# Patient Record
Sex: MALE | Race: WHITE | NOT HISPANIC OR LATINO | Employment: FULL TIME | URBAN - METROPOLITAN AREA
[De-identification: names, ages, dates, MRNs, and addresses within clinical notes are randomized per-mention and may not be internally consistent; named-entity substitution may affect disease eponyms.]

---

## 2017-01-17 ENCOUNTER — COMMUNICATION - HEALTHEAST (OUTPATIENT)
Dept: FAMILY MEDICINE | Facility: CLINIC | Age: 57
End: 2017-01-17

## 2017-01-18 ENCOUNTER — OFFICE VISIT - HEALTHEAST (OUTPATIENT)
Dept: FAMILY MEDICINE | Facility: CLINIC | Age: 57
End: 2017-01-18

## 2017-01-18 ENCOUNTER — COMMUNICATION - HEALTHEAST (OUTPATIENT)
Dept: TELEHEALTH | Facility: CLINIC | Age: 57
End: 2017-01-18

## 2017-01-18 DIAGNOSIS — J32.9 SINUSITIS: ICD-10-CM

## 2017-01-18 DIAGNOSIS — Z00.00 PHYSICAL EXAM: ICD-10-CM

## 2017-01-18 DIAGNOSIS — C44.91 SKIN CANCER, BASAL CELL: ICD-10-CM

## 2017-01-18 LAB
CHOLEST SERPL-MCNC: 182 MG/DL
FASTING STATUS PATIENT QL REPORTED: YES
HDLC SERPL-MCNC: 45 MG/DL
LDLC SERPL CALC-MCNC: 103 MG/DL
TRIGL SERPL-MCNC: 172 MG/DL

## 2017-01-18 ASSESSMENT — MIFFLIN-ST. JEOR: SCORE: 1653.35

## 2017-01-20 ENCOUNTER — COMMUNICATION - HEALTHEAST (OUTPATIENT)
Dept: FAMILY MEDICINE | Facility: CLINIC | Age: 57
End: 2017-01-20

## 2017-01-30 ENCOUNTER — COMMUNICATION - HEALTHEAST (OUTPATIENT)
Dept: FAMILY MEDICINE | Facility: CLINIC | Age: 57
End: 2017-01-30

## 2017-03-02 ENCOUNTER — RECORDS - HEALTHEAST (OUTPATIENT)
Dept: ADMINISTRATIVE | Facility: OTHER | Age: 57
End: 2017-03-02

## 2017-03-06 ENCOUNTER — COMMUNICATION - HEALTHEAST (OUTPATIENT)
Dept: FAMILY MEDICINE | Facility: CLINIC | Age: 57
End: 2017-03-06

## 2017-03-07 ENCOUNTER — OFFICE VISIT - HEALTHEAST (OUTPATIENT)
Dept: FAMILY MEDICINE | Facility: CLINIC | Age: 57
End: 2017-03-07

## 2017-03-07 DIAGNOSIS — R09.81 SINUS CONGESTION: ICD-10-CM

## 2017-06-06 ENCOUNTER — RECORDS - HEALTHEAST (OUTPATIENT)
Dept: ADMINISTRATIVE | Facility: OTHER | Age: 57
End: 2017-06-06

## 2017-06-22 ENCOUNTER — RECORDS - HEALTHEAST (OUTPATIENT)
Dept: ADMINISTRATIVE | Facility: OTHER | Age: 57
End: 2017-06-22

## 2018-04-18 ENCOUNTER — COMMUNICATION - HEALTHEAST (OUTPATIENT)
Dept: FAMILY MEDICINE | Facility: CLINIC | Age: 58
End: 2018-04-18

## 2018-04-18 ENCOUNTER — OFFICE VISIT - HEALTHEAST (OUTPATIENT)
Dept: FAMILY MEDICINE | Facility: CLINIC | Age: 58
End: 2018-04-18

## 2018-04-18 DIAGNOSIS — Z00.00 PHYSICAL EXAM: ICD-10-CM

## 2018-04-18 DIAGNOSIS — Z13.220 SCREENING FOR LIPID DISORDERS: ICD-10-CM

## 2018-04-18 DIAGNOSIS — Z12.5 SCREENING FOR PROSTATE CANCER: ICD-10-CM

## 2018-04-18 LAB
ALBUMIN SERPL-MCNC: 3.8 G/DL (ref 3.5–5)
ALP SERPL-CCNC: 51 U/L (ref 45–120)
ALT SERPL W P-5'-P-CCNC: 22 U/L (ref 0–45)
ANION GAP SERPL CALCULATED.3IONS-SCNC: 8 MMOL/L (ref 5–18)
AST SERPL W P-5'-P-CCNC: 24 U/L (ref 0–40)
BILIRUB SERPL-MCNC: 0.6 MG/DL (ref 0–1)
BUN SERPL-MCNC: 13 MG/DL (ref 8–22)
CALCIUM SERPL-MCNC: 8.8 MG/DL (ref 8.5–10.5)
CHLORIDE BLD-SCNC: 108 MMOL/L (ref 98–107)
CHOLEST SERPL-MCNC: 177 MG/DL
CO2 SERPL-SCNC: 26 MMOL/L (ref 22–31)
CREAT SERPL-MCNC: 0.99 MG/DL (ref 0.7–1.3)
FASTING STATUS PATIENT QL REPORTED: NORMAL
GFR SERPL CREATININE-BSD FRML MDRD: >60 ML/MIN/1.73M2
GLUCOSE BLD-MCNC: 98 MG/DL (ref 70–125)
HDLC SERPL-MCNC: 49 MG/DL
LDLC SERPL CALC-MCNC: 111 MG/DL
POTASSIUM BLD-SCNC: 4.4 MMOL/L (ref 3.5–5)
PROT SERPL-MCNC: 6.5 G/DL (ref 6–8)
PSA SERPL-MCNC: 1 NG/ML (ref 0–3.5)
SODIUM SERPL-SCNC: 142 MMOL/L (ref 136–145)
TRIGL SERPL-MCNC: 87 MG/DL

## 2018-04-18 ASSESSMENT — MIFFLIN-ST. JEOR: SCORE: 1610.25

## 2018-08-20 ENCOUNTER — RECORDS - HEALTHEAST (OUTPATIENT)
Dept: ADMINISTRATIVE | Facility: OTHER | Age: 58
End: 2018-08-20

## 2018-09-05 ENCOUNTER — RECORDS - HEALTHEAST (OUTPATIENT)
Dept: ADMINISTRATIVE | Facility: OTHER | Age: 58
End: 2018-09-05

## 2019-01-23 ENCOUNTER — RECORDS - HEALTHEAST (OUTPATIENT)
Dept: ADMINISTRATIVE | Facility: OTHER | Age: 59
End: 2019-01-23

## 2019-02-21 ENCOUNTER — RECORDS - HEALTHEAST (OUTPATIENT)
Dept: ADMINISTRATIVE | Facility: OTHER | Age: 59
End: 2019-02-21

## 2019-04-23 ENCOUNTER — OFFICE VISIT - HEALTHEAST (OUTPATIENT)
Dept: FAMILY MEDICINE | Facility: CLINIC | Age: 59
End: 2019-04-23

## 2019-04-23 DIAGNOSIS — Z13.220 SCREENING FOR LIPID DISORDERS: ICD-10-CM

## 2019-04-23 DIAGNOSIS — C44.91 SKIN CANCER, BASAL CELL: ICD-10-CM

## 2019-04-23 DIAGNOSIS — Z12.5 SCREENING FOR PROSTATE CANCER: ICD-10-CM

## 2019-04-23 DIAGNOSIS — Z00.00 PHYSICAL EXAM: ICD-10-CM

## 2019-04-23 LAB
CHOLEST SERPL-MCNC: 179 MG/DL
FASTING STATUS PATIENT QL REPORTED: YES
FASTING STATUS PATIENT QL REPORTED: YES
GLUCOSE BLD-MCNC: 100 MG/DL (ref 70–125)
HDLC SERPL-MCNC: 48 MG/DL
LDLC SERPL CALC-MCNC: 107 MG/DL
TRIGL SERPL-MCNC: 121 MG/DL

## 2019-04-23 ASSESSMENT — MIFFLIN-ST. JEOR: SCORE: 1618.42

## 2019-04-24 ENCOUNTER — COMMUNICATION - HEALTHEAST (OUTPATIENT)
Dept: FAMILY MEDICINE | Facility: CLINIC | Age: 59
End: 2019-04-24

## 2019-09-03 ENCOUNTER — RECORDS - HEALTHEAST (OUTPATIENT)
Dept: ADMINISTRATIVE | Facility: OTHER | Age: 59
End: 2019-09-03

## 2020-01-10 ENCOUNTER — OFFICE VISIT - HEALTHEAST (OUTPATIENT)
Dept: FAMILY MEDICINE | Facility: CLINIC | Age: 60
End: 2020-01-10

## 2020-01-10 DIAGNOSIS — R00.2 AWARENESS OF HEART BEAT: ICD-10-CM

## 2020-01-10 DIAGNOSIS — Z12.11 SCREEN FOR COLON CANCER: ICD-10-CM

## 2020-02-27 ENCOUNTER — HOSPITAL ENCOUNTER (OUTPATIENT)
Dept: CARDIOLOGY | Facility: CLINIC | Age: 60
Discharge: HOME | End: 2020-02-27
Attending: FAMILY MEDICINE

## 2020-02-27 DIAGNOSIS — R00.2 AWARENESS OF HEART BEAT: ICD-10-CM

## 2020-03-02 ENCOUNTER — COMMUNICATION - HEALTHEAST (OUTPATIENT)
Dept: FAMILY MEDICINE | Facility: CLINIC | Age: 60
End: 2020-03-02

## 2020-03-06 ENCOUNTER — RECORDS - HEALTHEAST (OUTPATIENT)
Dept: ADMINISTRATIVE | Facility: OTHER | Age: 60
End: 2020-03-06

## 2020-09-19 ENCOUNTER — VIRTUAL VISIT (OUTPATIENT)
Dept: FAMILY MEDICINE | Facility: OTHER | Age: 60
End: 2020-09-19
Payer: COMMERCIAL

## 2020-09-19 PROCEDURE — 99421 OL DIG E/M SVC 5-10 MIN: CPT | Performed by: FAMILY MEDICINE

## 2020-09-20 ENCOUNTER — AMBULATORY - HEALTHEAST (OUTPATIENT)
Dept: FAMILY MEDICINE | Facility: CLINIC | Age: 60
End: 2020-09-20

## 2020-09-20 DIAGNOSIS — Z20.822 SUSPECTED COVID-19 VIRUS INFECTION: ICD-10-CM

## 2020-09-20 NOTE — PROGRESS NOTES
"Date: 2020 12:46:21  Clinician: Alba Paige  Clinician NPI: [id number]  Patient: Gabino Naranjo  Patient : 1960  Patient Address: [address] [place] [address]  Patient Phone: [phone]  Visit Protocol: URI  Patient Summary:  Gabino is a 59 year old ( : 1960 ) male who initiated a [facility] Visit for COVID-19 (Coronavirus) evaluation and screening. When asked the question \"Please sign me up to receive news, health information and promotions from [facility].\", Gabino responded \"Yes\".    When asked when his symptoms started, Gabino reported that he does not have any symptoms.   He denies taking antibiotic medication in the past month and having recent facial or sinus surgery in the past 60 days.    Pertinent COVID-19 (Coronavirus) information  In the past 14 days, Gabino has not worked in a congregate living setting.   He does not work or volunteer as healthcare worker or a  and does not work or volunteer in a healthcare facility.   Gabino also has not lived in a congregate living setting in the past 14 days. He lives with a healthcare worker.   Gabino has had a close contact with a laboratory-confirmed COVID-19 patient in the last 14 days. Additional information about contact with COVID-19 (Coronavirus) patient as reported by the patient (free text): My wife and I were houseguests at our friends home. We ate together around a table, and we slept in their guest room last night. This morning,  our friend had a fever. We left their house,  and she was tested earlier today- positive for COVID.  Do my wife and I need to be tested today since we were exposed?   Patient reported they are not living in the same household with a COVID-19 positive patient.  Patient was in an enclosed space for greater than 15 minutes with a COVID-19 patient.  Since 2019, [place] and has not had upper respiratory infection or influenza-like illness. Has not been diagnosed with " lab-confirmed COVID-19 test   Pertinent medical history  Gabino needs a return to work/school note.   Weight: 175 lbs   Gabino does not smoke or use smokeless tobacco.   Weight: 175 lbs    MEDICATIONS: No current medications, ALLERGIES: NKDA  Clinician Response:  Dear Gabino,   Based on your exposure to COVID-19 (coronavirus), we would like to test you for this virus.  1. Please call [phone] to schedule your visit. Explain that you were referred by [facility] to have a COVID-19 test. Be ready to [facility].  The following will serve as your written order for this COVID Test, ordered by me, for the indication of suspected COVID [Z20.828]: The test will be ordered in [employer], our electronic health record, after you are scheduled. It will show as ordered and authorized by Andrei Herrera MD.  Order: COVID-19 (coronavirus) PCR for ASYMPTOMATIC EXPOSURE testing from [facility].  If you know you have had close contact with someone who tested positive, you should be quarantined for 14 days after this exposure. You should stay in quarantine for the14 days even if the covid test is negative, the optimal time to test after exposure is 5-7 days from the exposure  Quarantine means   What should I do?  For safety, it's very important to follow these rules. Do this for 14 days after the date you were last exposed to the virus..  Stay home and away from others. Don't go to school or anywhere else. Generally quarantine means staying home from work but there are some exceptions to this. Please contact your workplace.   No hugging, kissing or shaking hands.  Don't let anyone visit.  Cover your mouth and nose with a mask, tissue or washcloth to avoid spreading germs.  Wash your hands and face often. Use soap and water.  What are the symptoms of COVID-19?  The most common symptoms are cough, fever and trouble breathing. Less common symptoms include headache, body aches, fatigue (feeling very tired), chills, sore throat,  stuffy or runny nose, diarrhea (loose poop), loss of taste or smell, belly pain, and nausea or vomiting (feeling sick to your stomach or throwing up).  After 14 days, if you have still don't have symptoms, you likely don't have this virus.  If you develop symptoms, follow these guidelines.  If you're normally healthy: Please start another [facility] visit to report your symptoms. Go to [email or web address].  If you have a serious health problem (like cancer, heart failure, an organ transplant or kidney disease): Call your specialty clinic. Let them know that you might have COVID-19.  2. When it's time for your COVID test:  Stay at least 6 feet away from others. (If someone will drive you to your test, stay in the backseat, as far away from the  as you can.)  Cover your mouth and nose with a mask, tissue or washcloth.  Go straight to the testing site. Don't make any stops on the way there or back.  Please note  Caregivers in these groups are at risk for severe illness due to COVID-19:  o People 65 years and older  o People who live in a nursing home or long-term care facility  o People with chronic disease (lung, heart, cancer, diabetes, kidney, liver, immunologic)  o People who have a weakened immune system, including those who:  Are in cancer treatment  Take medicine that weakens the immune system, such as corticosteroids  Had a bone marrow or organ transplant  Have an immune deficiency  Have poorly controlled HIV or AIDS  Are obese (body mass index of 40 or higher)  Smoke regularly  Where can I get more information?  [facility] -- About COVID-19: [email or web address]  CDC -- What to Do If You're Sick: www.cdc.gov/coronavirus/2019-ncov/about/steps-when-sick.html  CDC -- Ending Home Isolation: www.cdc.gov/coronavirus/2019-ncov/hcp/disposition-in-home-patients.html  CDC -- Caring for Someone: www.cdc.gov/coronavirus/2019-ncov/if-you-are-sick/care-for-someone.html  [facility] -- Interim Guidance for [facility]  Discharge to Home: www.health.[facility].mn.us/diseases/coronavirus/hcp/hospdischarge.pdf  [facility] clinical trials (COVID-19 research studies): clinicalaffairs.[facility]/umn-clinical-trials  Below are the COVID-19 hotlines at the [place] Department of Health ([facility]). Interpreters are available.  For health questions: Call 680-461-0389 or 1-659.246.7682 (7 a.m. to 7 p.m.)  For questions about schools and childcare: Call 926-873-0546 or 1-993.326.2978 (7 a.m. to 7 p.m.)    Diagnosis: Contact with and (suspected) exposure to other viral communicable diseases  Diagnosis ICD: Z20.828  Additional Clinician Notes:  Best time to get tested is 5-7 days from now. There is a higher chance of a false negative test if done too soon from exposure

## 2020-09-22 ENCOUNTER — COMMUNICATION - HEALTHEAST (OUTPATIENT)
Dept: SCHEDULING | Facility: CLINIC | Age: 60
End: 2020-09-22

## 2021-01-14 ENCOUNTER — RECORDS - HEALTHEAST (OUTPATIENT)
Dept: ADMINISTRATIVE | Facility: OTHER | Age: 61
End: 2021-01-14

## 2021-01-28 ENCOUNTER — RECORDS - HEALTHEAST (OUTPATIENT)
Dept: ADMINISTRATIVE | Facility: OTHER | Age: 61
End: 2021-01-28

## 2021-04-28 ENCOUNTER — AMBULATORY - HEALTHEAST (OUTPATIENT)
Dept: NURSING | Facility: CLINIC | Age: 61
End: 2021-04-28

## 2021-05-19 ENCOUNTER — AMBULATORY - HEALTHEAST (OUTPATIENT)
Dept: NURSING | Facility: CLINIC | Age: 61
End: 2021-05-19

## 2021-05-30 VITALS — WEIGHT: 192 LBS | HEIGHT: 69 IN | BODY MASS INDEX: 28.44 KG/M2

## 2021-05-30 VITALS — WEIGHT: 191 LBS | BODY MASS INDEX: 28.62 KG/M2

## 2021-06-01 VITALS — HEIGHT: 70 IN | BODY MASS INDEX: 25.62 KG/M2 | WEIGHT: 179 LBS

## 2021-06-03 VITALS — HEIGHT: 69 IN | WEIGHT: 184.3 LBS | BODY MASS INDEX: 27.3 KG/M2

## 2021-06-04 VITALS
WEIGHT: 184.8 LBS | BODY MASS INDEX: 27.69 KG/M2 | HEART RATE: 75 BPM | SYSTOLIC BLOOD PRESSURE: 154 MMHG | DIASTOLIC BLOOD PRESSURE: 77 MMHG | OXYGEN SATURATION: 98 %

## 2021-06-05 NOTE — PATIENT INSTRUCTIONS - HE
I will contact you with the results of the Holter monitor.  See us in the next several months for a physical.

## 2021-06-05 NOTE — PROGRESS NOTES
ASSESSMENT:  1. Screen for colon cancer  Patient will be soon due for upcoming colorectal cancer screening.  - Ambulatory referral for Colonoscopy    2. Awareness of heart beat  Patient with a subjective sensation of an awareness of his heartbeat may be a rapid heart rate though this is not clear.  He is not having symptoms suggestive of coronary artery disease but there could potentially be an arrhythmia and there is a strong family history of arrhythmia.  - Holter Monitor; Future        PLAN:  1.  Holter monitor.  2.  Patient also though is probably at least somewhat physically deconditioned, and wants a Holter monitor is available we will proceed accordingly but probably the patient needs to be more physically active.  3.  Referral for colonoscopy.  4.  Patient is also going to be seen in the next several months for a physical.    Orders Placed This Encounter   Procedures     Ambulatory referral for Colonoscopy     Referral Priority:   Routine     Referral Type:   Colonoscopy     Referral Reason:   Evaluation and Treatment     Requested Specialty:   Gastroenterology     Number of Visits Requested:   1     There are no discontinued medications.    No follow-ups on file.    CHIEF COMPLAINT:  Chief Complaint   Patient presents with     Blood Pressure Check     does not check outside of clinic      Heart Problem     would like heart rate checked      Personal Problem     discuss getting fit        SUBJECTIVE:  Gabino is a 59 y.o. male who comes in because he is noticed over the past perhaps several months that he will get a sensation of his heart rate, may be is going a bit fast is not pain or palpitation per se but is rather an awareness.  Also in the evening fairly frequently after he eats he gets a sensation in his lower abdomen of something going on it almost feels like it is his heart rate as well, he would not describe it as typical reflux symptoms though he does take Tums several times per week.    He does  readily admit that he has not been very physically active now for a number of months so I discussed with the patient that there certainly could be some element of deconditioning, however in review of his history there is a strong family history of arrhythmia and this is a possibility as well.  The patient is not having any classic symptoms suggestive of heart disease per se.      REVIEW OF SYSTEMS:      All other systems are negative.    PFSH:  Immunization History   Administered Date(s) Administered     DT (pediatric) 01/01/2001     Influenza, Seasonal, Inj PF IIV3 10/25/2012     Influenza, inj, historic,unspecified 01/18/2017     Influenza,seasonal quad, PF 02/21/2014     Influenza,seasonal quad, PF, =/> 6months 02/21/2014     Influenza,seasonal,quad inj =/> 6months 01/18/2017     Pneumo Polysac 23-V 10/25/2012     Td,adult,historic,unspecified 08/01/2008     Tdap 08/01/2008, 04/18/2018     Social History     Socioeconomic History     Marital status:      Spouse name: Not on file     Number of children: 5     Years of education: Not on file     Highest education level: Not on file   Occupational History     Occupation: [employer]     Comment: [employer]   Social Needs     Financial resource strain: Not on file     Food insecurity:     Worry: Not on file     Inability: Not on file     Transportation needs:     Medical: Not on file     Non-medical: Not on file   Tobacco Use     Smoking status: Never Smoker     Smokeless tobacco: Never Used   Substance and Sexual Activity     Alcohol use: Yes     Alcohol/week: 4.0 standard drinks     Types: 4 Cans of beer per week     Drug use: No     Sexual activity: Yes     Partners: Female   Lifestyle     Physical activity:     Days per week: Not on file     Minutes per session: Not on file     Stress: Not on file   Relationships     Social connections:     Talks on phone: Not on file     Gets together: Not on file     Attends L[employer]-day service: Not on file      Active member of club or organization: Not on file     Attends meetings of clubs or organizations: Not on file     Relationship status: Not on file     Intimate partner violence:     Fear of current or ex partner: Not on file     Emotionally abused: Not on file     Physically abused: Not on file     Forced sexual activity: Not on file   Other Topics Concern     Not on file   Social History Narrative    Diet-whole grain, fruit        Exercise- Walking- less in winter, treadmill     Past Medical History:   Diagnosis Date     Colorectal polyps     colonoscopy done 1/26/12.      Diverticulitis     clinical dx LLQ pain and elevated WBC/resolved w cipro and flagyl in [place]      Shingles     one time, on back     Family History   Problem Relation Age of Onset     Thyroid cancer Daughter      Valvular heart disease Mother         Mitral Valve Repair     Dementia Mother         Dx around 85     Osteoarthritis Father         back and neck     Arrhythmia Brother      Arrhythmia Brother        MEDICATIONS:  Current Outpatient Medications   Medication Sig Dispense Refill     MULTIVITAMIN (MULTIPLE VITAMIN ORAL) Take by mouth.       No current facility-administered medications for this visit.        TOBACCO USE:  Social History     Tobacco Use   Smoking Status Never Smoker   Smokeless Tobacco Never Used       VITALS:  Vitals:    01/10/20 1122   BP: 154/77   Pulse: 75   SpO2: 98%   Weight: 184 lb 12.8 oz (83.8 kg)     Wt Readings from Last 3 Encounters:   01/10/20 184 lb 12.8 oz (83.8 kg)   04/23/19 184 lb 4.8 oz (83.6 kg)   04/18/18 179 lb (81.2 kg)       PHYSICAL EXAM:  Constitutional:   Reveals a male who appears overall healthy.  Vitals: per nursing notes.  HEENT:  Ears:  External canals, TMs clear.    Eyes:  EOMs full, PERRL.  Lungs: Clear to A&P without rales or wheezes.  Respiratory effort normal.  Cardiac:   Regular rate and rhythm, normal S1, S2, no murmur or gallop.  Musculoskeletal: No peripheral  swelling.  Neuro:  Alert and oriented. Cranial nerves, motor, sensory exams are intact.  No gross focal deficits.  Psychiatric:  Memory intact, mood appropriate.    QUALITY MEASURES:      DATA REVIEWED:

## 2021-06-08 NOTE — PROGRESS NOTES
ASSESSMENT:  1. Physical exam  Patient overall has good health habits.  - Comprehensive Metabolic Panel  - Lipid Cascade    2. Skin cancer, basal cell  Recent diagnosis, followed by dermatology.    3. Sinusitis  Presumed, patient has had several months of sinus pressure and pain right cheek area.      PLAN:  1.  Influenza vaccine.  2.  Empiric treatment with Augmentin 875 mg twice daily ×10 days, patient also can continue over-the-counter Sudafed.  3.  Laboratory studies as above.  4.  I'm not continuing prostate cancer screening at this time.  5.  Patient otherwise can be seen in one year.    Orders Placed This Encounter   Procedures     Influenza, Seasonal,Quad Inj, 36+ MOS     Comprehensive Metabolic Panel     Lipid Cascade     Order Specific Question:   Fasting is required?     Answer:   Yes     There are no discontinued medications.    No Follow-up on file.    CHIEF COMPLAINT:  Chief Complaint   Patient presents with     Annual Exam     EST CARE-pt is fasting  NEEDS: flu vacc      Sinusitis     pressure under eyes and forehead o/o since November - has gotten worse        SUBJECTIVE:  Gabino is a 56 y.o. male who presents to the clinic today for an annual exam. He would also like to establish care. He is a former patient of Dr. Munoz.    Basal Cell Carcinoma: He has been seeing a dermatologist, Dr. Lisa Yi. He had an area of basal cell carcinoma removed from his back 12/2015.    Congestion: He began experiencing infrequent pain under his right eye and in his teeth on the right side, which he thought was a toothache, in October and November. He went to see his dentist, who suggested that this may be a sinus issue. This has worsened recently and is intermittent. He states that his nasal passages have been clear and he is not blowing his nose. He has used a vaporizer at night, which is helpful. He used Sudafed last night.    Health Maintenance: He is agreeable to the flu shot today. He does not currently  have a living will.    REVIEW OF SYSTEMS:   He is currently taking low-dose aspirin. He uses bifocal lenses and regularly sees ophthalmology. He states that he had diverticulitis in 2011. A later colonoscopy revealed diverticulosis. All other systems are negative.    PFSH:  His daughter has recently been cleared of thyroid cancer.  Immunization History   Administered Date(s) Administered     DT (pediatric) 01/01/2001     Influenza, Seasonal, Inj PF 10/25/2012     Influenza,seasonal quad, PF 02/21/2014     Pneumo Polysac 23-V 10/25/2012     Td, historic 08/01/2008     Tdap 08/01/2008     Social History     Social History     Marital status:      Spouse name: N/A     Number of children: 5     Years of education: N/A     Occupational History     [employer]     Social History Main Topics     Smoking status: Never Smoker     Smokeless tobacco: Never Used     Alcohol use 2.4 oz/week     4 Cans of beer per week     Drug use: No     Sexual activity: Not on file     Other Topics Concern     Not on file     Social History Narrative    Exercise- Walking- less in winter, treadmill     Past Medical History   Diagnosis Date     Colorectal polyps      colonoscopy done 1/26/12.      Diverticulitis      clinical dx LLQ pain and elevated WBC/resolved w cipro and flagyl in [facility]      Diverticulosis      Family History   Problem Relation Age of Onset     Thyroid cancer Daughter      Valvular heart disease Mother      Mitral Valve Repair     Arrhythmia Brother      Arrhythmia Brother        MEDICATIONS:  Current Outpatient Prescriptions   Medication Sig Dispense Refill     aspirin 81 mg chewable tablet Chew 81 mg daily.       MULTIVITAMIN (MULTIPLE VITAMIN ORAL) Take by mouth.       amoxicillin-clavulanate (AUGMENTIN) 875-125 mg per tablet Take 1 tablet by mouth 2 (two) times a day for 10 days. 20 tablet 0     No current facility-administered medications for this visit.        TOBACCO  "USE:  History   Smoking Status     Never Smoker   Smokeless Tobacco     Never Used       VITALS:  Vitals:    01/18/17 1035   BP: 110/80   Pulse: 78   Weight: 192 lb (87.1 kg)   Height: 5' 8.5\" (1.74 m)     Wt Readings from Last 3 Encounters:   01/18/17 192 lb (87.1 kg)   05/24/16 193 lb 4.8 oz (87.7 kg)   06/29/15 187 lb (84.8 kg)       PHYSICAL EXAM:  Constitutional:   Reveals an alert male that appears stated age.  Vitals: per nursing notes.  HEENT: Right Ear: External ear normal.   Left Ear: External ear normal.   Nose: Some swelling of the nasal mucosa.  Mouth/Throat: Oropharynx is clear and moist.   Eyes: Conjunctivae and EOM are normal. Pupils are equal, round, and reactive to light. Right eye exhibits no discharge. Left eye exhibits no discharge.   Neck:  Supple, no carotid bruits or adenopathy.  Back:  No spine or CVA pain.  Thorax:  No bony deformities.  Lungs: Clear to A&P without rales or wheezes.  Respiratory effort normal.  Cardiac:   Regular rate and rhythm, normal S1, S2, no murmur or gallop.  Abdomen:  Soft, active bowel sounds without bruits, mass, or tenderness.  Extremities:   No peripheral edema, pulses in the feet intact.    Skin:  No jaundice, peripheral cyanosis or lesions to suggest malignancy.  Neuro:  Alert and oriented. Cranial nerves, motor, sensory exams are intact.  No gross focal deficits.  Psychiatric:  Memory intact, mood appropriate.    QUALITY MEASURES:  I have had an Advance Directives discussion with the patient.    DATA REVIEWED:  Additional History from Old Records Summarized (2): None  Decision to Obtain Records (1): None  Radiology Tests Summarized or Ordered (1): None  Labs Reviewed or Ordered (1): Reviewed 2/21/2014 labs. Ordered labs.  Medicine Test Summarized or Ordered (1): None  Independent Review of EKG, X-RAY, or RAPID STREP (2 each): None    The visit lasted a total of 19 minutes face to face with the patient. Over 50% of the time was spent counseling and educating " the patient about health maintenance.    I, Casimiro Linton, am scribing for and in the presence of, Dr. Carias.    I, Dr. Carias, personally performed the services described in this documentation, as scribed by Casimiro Linton in my presence, and it is both accurate and complete.    Total Data Points: 1

## 2021-06-09 NOTE — PROGRESS NOTES
ASSESSMENT:  1. Sinus congestion  Patient with a diagnosis of sinusitis in January, now with several days of sinus congestion or don't think an actual sinus infection.      PLAN:  1.  I want the patient first began with symptomatic treatment with Sudafed and nasal saline rinsing with [facility].  2.  I gave the patient a printed prescription for Augmentin 875 mg twice daily for 10 days, but only to start that in 3 or 4 days his symptoms are worsening or not improving.  3.  Patient also discussed some preventative maintenance issues he just had a colonoscopy, had some further questions about prostate cancer screening will be addressed at his next physical  4.  Patient be due for a physical January 2018    No orders of the defined types were placed in this encounter.    Medications Discontinued During This Encounter   Medication Reason     amoxicillin-clavulanate (AUGMENTIN) 875-125 mg per tablet Therapy completed       No Follow-up on file.    CHIEF COMPLAINT:  Chief Complaint   Patient presents with     Sinusitis     pressure under eyes and forehead on the right side - sore in the roof of his mouth last pm-        SUBJECTIVE:  Gabino is a 56 y.o. male who was seen by Nikia for physical in January of this year and also had a sinus infection treated with Augmentin.  He did get better he actually was retreated again in February, symptoms did resolve but now the past several days he's having some sinus congestion and some pain and pressure particularly in the right side of the cheek right teeth area.  No fever chills not around anybody who is sick currently.  He's not taking anything for this, he is worried that he could have another sinus infection.    He just had a colonoscopy apparently has polyps and needs three-year follow-up though I don't have the report yet available.  Also concerned about screening for prostate cancer but we discussed some of the issues and controversies around this.    REVIEW OF SYSTEMS:   No  other change in his health status  All other systems are negative.    PFSH:  Immunization History   Administered Date(s) Administered     DT (pediatric) 01/01/2001     Influenza, Seasonal, Inj PF 10/25/2012     Influenza, seasonal,quad inj 36+ mos 01/18/2017     Influenza,seasonal quad, PF 02/21/2014     Pneumo Polysac 23-V 10/25/2012     Td, historic 08/01/2008     Tdap 08/01/2008     Social History     Social History     Marital status:      Spouse name: N/A     Number of children: 5     Years of education: N/A     Occupational History     [employer]     Social History Main Topics     Smoking status: Never Smoker     Smokeless tobacco: Never Used     Alcohol use 2.4 oz/week     4 Cans of beer per week     Drug use: No     Sexual activity: Not on file     Other Topics Concern     Not on file     Social History Narrative    Exercise- Walking- less in winter, treadmill     Past Medical History:   Diagnosis Date     Colorectal polyps     colonoscopy done 1/26/12.      Diverticulitis     clinical dx LLQ pain and elevated WBC/resolved w cipro and flagyl in [place]      Diverticulosis      Family History   Problem Relation Age of Onset     Thyroid cancer Daughter      Valvular heart disease Mother      Mitral Valve Repair     Arrhythmia Brother      Arrhythmia Brother        MEDICATIONS:  Current Outpatient Prescriptions   Medication Sig Dispense Refill     amoxicillin-clavulanate (AUGMENTIN) 875-125 mg per tablet TAKE 1 TABLET BY MOUTH 2 (TWO) TIMES A DAY FOR 10 DAYS. 20 tablet 0     aspirin 81 mg chewable tablet Chew 81 mg daily.       MULTIVITAMIN (MULTIPLE VITAMIN ORAL) Take by mouth.       No current facility-administered medications for this visit.        TOBACCO USE:  History   Smoking Status     Never Smoker   Smokeless Tobacco     Never Used       VITALS:  Vitals:    03/07/17 0818   BP: 130/70   Pulse: 74   Weight: 191 lb (86.6 kg)     Wt Readings from Last 3 Encounters:    03/07/17 191 lb (86.6 kg)   01/18/17 192 lb (87.1 kg)   05/24/16 193 lb 4.8 oz (87.7 kg)       PHYSICAL EXAM:  Constitutional:   Reveals a male who appears healthy not obviously sick.  Vitals: per nursing notes.  HEENT:  Ears:  External canals, TMs clear.  Nasal mucosal congestion bilaterally  Eyes:  EOMs full, PERRL.  Lungs: Clear to A&P without rales or wheezes.  Respiratory effort normal.  Cardiac:   Regular rate and rhythm, normal S1, S2, no murmur or gallop.  Musculoskeletal: No peripheral swelling.  Neuro:  Alert and oriented. Cranial nerves, motor, sensory exams are intact.  No gross focal deficits.  Psychiatric:  Memory intact, mood appropriate.    QUALITY MEASURES:      DATA REVIEWED:   Physical from January 2017 reviewed

## 2021-06-17 NOTE — PROGRESS NOTES
MALE PREVENTATIVE EXAM    Assessment and Plan:   ASSESSMENT:  1. Physical exam  Patient overall has good health habits, he is improved diet and physical activity.    2. Screening for prostate cancer  No family history of prostate cancer.  - PSA (Prostatic-Specific Antigen), Annual Screen    3. Screening for lipid disorders     - Comprehensive Metabolic Panel  - Lipid Cascade      PLAN:   1.  Laboratory studies as above.  2.  Patient sees dermatology yearly for history of basal cell skin cancer.  3.  He should otherwise maintain healthy habits and should be seen yearly.       Next follow up:  No Follow-up on file.    Immunization Review  Adult Imm Review: No immunizations due today    I discussed the following with the patient:   Adult Healthy Living: Importance of regular exercise    I have had an Advance Directives discussion with the patient.    Subjective:   Chief Complaint: Gabino Naranjo is an 57 y.o. male here for a preventative health visit.     HPI:    Health Maintenance: He had a colonoscopy completed on 3/2/17 which consisted of the removal of multiple polyps. As a result, he was encouraged to follow-up with another procedure in three years. His last tetanus shot was in August of 2008 but he would like to receive an update on the immunization today.     Healthy Habits  Are you taking a daily aspirin? Yes  Do you typically exercise at least 40 min, 3-4 times per week?  Yes  Do you usually eat at least 4 servings of fruit and vegetables a day, include whole grains and fiber and avoid regularly eating high fat foods? Yes  Have you had an eye exam in the past two years? Yes  Do you see a dentist twice per year? Yes  Do you have any concerns regarding sleep? No    Safety Screen  If you own firearms, are they secured in a locked gun cabinet or with trigger locks? The patient does not own any firearms  Do you feel you are safe where you are living?: Yes (4/18/2018  7:56 AM)  Do you feel you are safe in your  "relationship(s)?: Yes (4/18/2018  7:56 AM)    Review of Systems:    He was seen for what he believed was sinusitis last year but upon visiting the dentist, was found to have an abscess. He has a history of basal cell skin cancer on his back which was removed; he receives full body skin checks through dermatology once a year. He has made changes in his diet and exercise habits resulting in a healthy amount of weight loss. He does not struggle with seasonal allergies. He infrequently experiences symptoms associated with heartburn or indigestion. He has no concerns with regard to his hearing or vision; the ophthalmologist is monitoring the beginning stages of cataracts. His bladder and bowel habits have remained stable. He denies any significant joint aches or pains.   All other systems negative.     PFSH:  Past Medical: He once experienced an episode of diverticulitis.   Social: He continues to work as a Christian .  Family: His mother required a heart valve repair last year. Her cognitive skills have started to decline. His father has chronic neck and back problems.     Cancer Screening       Status Date      COLONOSCOPY Next Due 3/2/2020      Done 3/2/2017      Patient has more history with this topic...        Patient Care Team:  Mitul Carias MD as PCP - General (Family Medicine)    History     Reviewed By Date/Time Sections Reviewed    Mitul Carias MD 4/18/2018  8:09 AM Medical, Tobacco, Alcohol, Drug Use, Sexual Activity, Family    Manda Philip, [facility] 4/18/2018  8:00 AM Tobacco        Objective:   Vital Signs:   Visit Vitals     /70     Pulse 70     Ht 5' 9.5\" (1.765 m)     Wt 179 lb (81.2 kg)     BMI 26.05 kg/m2      PHYSICAL EXAM  Constitutional:   Reveals a pleasant male that appears stated age.  Vitals: per nursing notes.  HEENT: Right Ear: External ear normal.   Left Ear: External ear normal.   Nose: Nose normal.   Mouth/Throat: Oropharynx is clear and moist.   Eyes: Conjunctivae and EOM are " normal. Pupils are equal, round, and reactive to light. Right eye exhibits no discharge. Left eye exhibits no discharge.   Neck:  Supple, no carotid bruits or adenopathy.  Back:  No spine or CVA pain.  Thorax:  No bony deformities.  Lungs: Clear to A&P without rales or wheezes.  Respiratory effort normal.  Cardiac:   Regular rate and rhythm, normal S1, S2, no murmur or gallop.  Abdomen:  Soft, active bowel sounds without bruits, mass, or tenderness.  Extremities:   No peripheral edema, pulses in the feet intact.    Skin:  No jaundice, peripheral cyanosis or lesions to suggest malignancy.  Neuro:  Alert and oriented. Cranial nerves, motor, sensory exams are intact.  No gross focal deficits.  Psychiatric:  Memory intact, mood appropriate.    The 10-year ASCVD risk score (Andres CHRISTELLE Jr, et al., 2013) is: 7.4%    Values used to calculate the score:      Age: 57 years      Sex: Male      Is Non- : No      Diabetic: No      Tobacco smoker: No      Systolic Blood Pressure: 124 mmHg      Is BP treated: No      HDL Cholesterol: 45 mg/dL      Total Cholesterol: 182 mg/dL       Medication List          These changes are accurate as of 4/18/18  9:24 AM.  If you have any questions, ask your nurse or doctor.               CONTINUE taking these medications          aspirin 81 mg chewable tablet   INSTRUCTIONS:  Chew 81 mg daily.           MULTIPLE VITAMIN ORAL   INSTRUCTIONS:  Take by mouth.             STOP taking these medications          amoxicillin-clavulanate 875-125 mg per tablet   Also known as:  AUGMENTIN   Stopped by:  Mitul Carias MD             Additional Screenings Completed Today:       DATA REVIEWED:  ADDITIONAL HISTORY SUMMARIZED (2): Reviewed progress note 1/18/17; physical, basal cell skin cancer. Reviewed [facility] note 3/2/17; colonoscopy.   DECISION TO OBTAIN EXTRA INFORMATION (1): None.   RADIOLOGY TESTS (1): None.  LABS (1): Ordered labs; lipid cascade, comprehensive metabolic panel, PSA.    MEDICINE TESTS (1): None.  INDEPENDENT REVIEW (2 each): None.     The visit lasted a total of 21 minutes face to face with the patient. Over 50% of the time was spent counseling and educating the patient about health maintenance.    I, Geovani Mcnally, am scribing for and in the presence of, Dr. Carias.    I, Dr. Carias, personally performed the services described in this documentation, as scribed by Geovani Mcnally in my presence, and it is both accurate and complete.    Total Data Points: 3

## 2021-06-19 NOTE — LETTER
Letter by Mitul Carias MD at      Author: Mitul Carias MD Service: -- Author Type: --    Filed:  Encounter Date: 4/24/2019 Status: (Other)         Gabino Naranjo  [address] [place] [address]             April 24, 2019         Dear Mr. Naranjo,    Below are the results from your recent visit: Sugar is exactly 100, ideally under 100, but this is not diabetes, but rather focus on good diet and exercise to help keep sugar low.  Cholesterol is very well controlled.    Resulted Orders   Glucose   Result Value Ref Range    Glucose 100 70 - 125 mg/dL    Patient Fasting > 8hrs? Yes     Narrative    Fasting Glucose reference range is 70-99 mg/dL per  American Diabetes Association (ADA) guidelines.   Lipid Cascade   Result Value Ref Range    Cholesterol 179 <=199 mg/dL    Triglycerides 121 <=149 mg/dL    HDL Cholesterol 48 >=40 mg/dL    LDL Calculated 107 <=129 mg/dL    Patient Fasting > 8hrs? Yes             Please call with questions or contact us using [employer].    Sincerely,        Electronically signed by Mitul Carias MD

## 2021-06-20 ENCOUNTER — HEALTH MAINTENANCE LETTER (OUTPATIENT)
Age: 61
End: 2021-06-20

## 2021-06-20 NOTE — LETTER
"Letter by Mitul Carias MD at      Author: Mitul Carias MD Service: -- Author Type: --    Filed:  Encounter Date: 3/2/2020 Status: (Other)         Gabino Naranjo  [address] [place] [address]             March 2, 2020         Dear Mr. Naranjo,    Below are the results from your recent visit: The Holter monitor is essentially normal were not picking up any concerning or unusual heart rhythm or heartbeat issues.    Resulted Orders   Holter Monitor    [facility]    HOLTER REPORT    Results:    Indication for study: Awareness of heartbeat    The predominant rhythm throughout the tracing was normal sinus rhythm   with an average heart rate of 79 bpm.  The chronotropic response to   activities of daily living appears to be normal.  The FL interval was   normal.  The QRS duration was normal.  The QT interval was normal.  The   study demonstrated no significant bradycardia/pauses.    The patient demonstrated rare atrial ectopy.  Nonsustained ectopic   atrial tachycardia was not observed on one occasion with a 3 beat run of   nonsustained ectopic atrial tachycardia.  Sustained ectopic atrial   tachycardia, atrial fibrillation, or other supraventricular tachycardia   was not observed.    The patient demonstrated rare ventricular ectopy.  Complex ventricular   ectopy was not observed.  Sustained ventricular tachycardia was not   observed.    The patient did return a diary.  Patient recorded 1 symptomatic event   \"felt heartbeat\".  This corresponded to normal sinus rhythm at 86 bpm.    There was no ectopy or other pathologic rhythm disturbance.    Impression:    Normal 24-hour Holter monitor recording.    Indication for study: Awareness of heartbeat.  The patient had 1   symptomatic injury corresponding to those symptoms.  The recording   demonstrated sinus rhythm with no ectopy or other pathologic rhythm   disturbance.    No sustained atrial or ventricular tachyarrhythmia.    No " profound bradycardia or pauses.      Comment: The recording was for 23 hours and 59 minutes.  The recording   quality was adequate.              Please call with questions or contact us using [employer].    Sincerely,        Electronically signed by Mitul Carias MD

## 2021-06-27 NOTE — PROGRESS NOTES
Progress Notes by Mitul Carias MD at 4/23/2019  8:50 AM     Author: Mitul Carias MD Service: -- Author Type: Physician    Filed: 4/23/2019  9:45 AM Encounter Date: 4/23/2019 Status: Signed    : Mitul Carias MD (Physician)       MALE PREVENTATIVE EXAM    Assessment and Plan:       1. Physical exam  Patient overall has very good health habits.    2. Skin cancer, basal cell  Followed regularly by dermatology.    3. Screening for lipid disorders  No family history of lipid or diabetes disorders.  - Glucose  - Lipid Cascade    4. Screening for prostate cancer  No family history of prostate cancer.    PLAN:  1.  Patient is seen regularly by dermatology.  2.  Laboratory studies as above.  3.  Defer prostate cancer screening for this year, may consider next year but no family history.  4.  Patient otherwise to be seen yearly.        Next follow up:  Return in about 1 year (around 4/23/2020) for Annual physical.    Immunization Review  Adult Imm Review: Missing doses of New shingles vaccine      I discussed the following with the patient:   Adult Healthy Living: Importance of regular exercise  Healthy nutrition        Subjective:   Chief Complaint: Gabino Naranjo is an 58 y.o. male here for a preventative health visit.     HPI: Patient has a history of basal cell carcinoma and sees dermatology twice a year. He had a Mohs procedure around eight months ago. He has not been exercising as much this past year but plans to get back into it. He denies any changes in his bladder habits. He denies any joint pain. He notes that he sees an eye specialist who is following his cataracts.     PFSH:   Family: His mother had dementia. She had a mitral valve repair. His father has arthritis. Both of his brothers have had heart rhythm issues.   Surgery: He had Mohs surgery. He had left inguinal hernia repair. He had a tonsillectomy.     Healthy Habits  Are you taking a daily aspirin? No  Do you typically exercising at  "least 40 min, 3-4 times per week?  NO  Do you usually eat at least 4 servings of fruit and vegetables a day, include whole grains and fiber and avoid regularly eating high fat foods? Yes  Have you had an eye exam in the past two years? Yes  Do you see a dentist twice per year? Yes  Do you have any concerns regarding sleep? No    Safety Screen  If you own firearms, are they secured in a locked gun cabinet or with trigger locks? The patient does not own any firearms  Do you feel you are safe where you are living?: Yes (4/23/2019  8:46 AM)  Do you feel you are safe in your relationship(s)?: Yes (4/23/2019  8:46 AM)      Review of Systems:  Please see above.  The rest of the review of systems are negative for all systems.     Cancer Screening       Status Date      COLONOSCOPY Next Due 3/2/2020      Done 3/2/2017      Patient has more history with this topic...          Patient Care Team:  Mitul Carias MD as PCP - General (Family Medicine)        History     Reviewed By Date/Time Sections Reviewed    Mitul Carias MD 4/23/2019  8:51 AM Medical, Surgical, Tobacco, Alcohol, Drug Use, Sexual Activity, Family, Social Documentation, Socioeconomic, Lifestyle, Relationships    Manda Barrientos [facility] 4/23/2019  8:48 AM Tobacco            Objective:   Vital Signs:   Visit Vitals  /85   Pulse 80   Ht 5' 8.5\" (1.74 m)   Wt 184 lb 4.8 oz (83.6 kg)   SpO2 96%   BMI 27.62 kg/m           PHYSICAL EXAM  Constitutional:   Reveals a healthy appearing male.  Vitals: per nursing notes.  HEENT: Right Ear: External ear normal.   Left Ear: External ear normal.   Nose: Nose normal.   Mouth/Throat: Oropharynx is clear and moist.   Eyes: Conjunctivae and EOM are normal. Pupils are equal, round, and reactive to light. Right eye exhibits no discharge. Left eye exhibits no discharge.   Neck:  Supple, no carotid bruits or adenopathy.  Back:  No spine or CVA pain.  Thorax:  No bony deformities.  Lungs: Clear to A&P without rales or " wheezes.  Respiratory effort normal.  Cardiac:   Regular rate and rhythm, normal S1, S2, no murmur or gallop.  Abdomen:  Soft, active bowel sounds without bruits, mass, or tenderness.  Extremities:   No peripheral edema, pulses in the feet intact.    Skin:  No jaundice, peripheral cyanosis or lesions to suggest malignancy.  Neuro:  Alert and oriented. Cranial nerves, motor, sensory exams are intact.  No gross focal deficits.  Psychiatric:  Memory intact, mood appropriate.    ADDITIONAL HISTORY SUMMARIZED (2): None.   DECISION TO OBTAIN EXTRA INFORMATION (1): None.   RADIOLOGY TESTS (1): None.  LABS (1): Reviewed labs from 4/18/18: PSA: 1.0, Cholesterol: Good, Glucose: 98. Ordered labs.  MEDICINE TESTS (1): None.  INDEPENDENT REVIEW (2 each): None.     Total data points = 1     Total time was 26 minutes, greater than 50% counseling and coordinating care regarding the above issues.        The 10-year ASCVD risk score (Andreslisa BOURGEOIS Jr., et al., 2013) is: 7.8%    Values used to calculate the score:      Age: 58 years      Sex: Male      Is Non- : No      Diabetic: No      Tobacco smoker: No      Systolic Blood Pressure: 128 mmHg      Is BP treated: No      HDL Cholesterol: 49 mg/dL      Total Cholesterol: 177 mg/dL         Medication List           Accurate as of 4/23/19  9:43 AM. If you have any questions, ask your nurse or doctor.               CONTINUE taking these medications    MULTIPLE VITAMIN ORAL  INSTRUCTIONS:  Take by mouth.           STOP taking these medications    aspirin 81 mg chewable tablet  Stopped by:  Mitul Carias MD            Additional Screenings Completed Today:     By signing my name below, Les AUSTIN, attest that this documentation has been prepared under the direction and in the presence of Dr. Mitul Carias.  Electronic Signature: Kelly Kaufman. 4/23/2019 8:51 AM.    Dr. Aretha AUSTIN, personally performed the services described in this documentation. All  medical record entries made by the scribe were at my direction and in my presence. I have reviewed the chart and discharge instructions (if applicable) and agree that the record reflects my personal performance and is accurate and complete.

## 2021-10-11 ENCOUNTER — HEALTH MAINTENANCE LETTER (OUTPATIENT)
Age: 61
End: 2021-10-11

## 2022-03-17 ENCOUNTER — TRANSFERRED RECORDS (OUTPATIENT)
Dept: HEALTH INFORMATION MANAGEMENT | Facility: CLINIC | Age: 62
End: 2022-03-17
Payer: COMMERCIAL

## 2022-04-20 ENCOUNTER — OFFICE VISIT (OUTPATIENT)
Dept: FAMILY MEDICINE | Facility: CLINIC | Age: 62
End: 2022-04-20
Payer: COMMERCIAL

## 2022-04-20 VITALS
HEART RATE: 73 BPM | WEIGHT: 170.5 LBS | BODY MASS INDEX: 25.84 KG/M2 | OXYGEN SATURATION: 100 % | HEIGHT: 68 IN | DIASTOLIC BLOOD PRESSURE: 71 MMHG | SYSTOLIC BLOOD PRESSURE: 129 MMHG

## 2022-04-20 DIAGNOSIS — Z13.220 SCREENING FOR LIPID DISORDERS: ICD-10-CM

## 2022-04-20 DIAGNOSIS — Z12.5 SCREENING FOR PROSTATE CANCER: ICD-10-CM

## 2022-04-20 DIAGNOSIS — Z23 HIGH PRIORITY FOR 2019-NCOV VACCINE: ICD-10-CM

## 2022-04-20 DIAGNOSIS — Z11.59 NEED FOR HEPATITIS C SCREENING TEST: Primary | ICD-10-CM

## 2022-04-20 DIAGNOSIS — Z00.00 PHYSICAL EXAM: ICD-10-CM

## 2022-04-20 DIAGNOSIS — C44.91 SKIN CANCER, BASAL CELL: ICD-10-CM

## 2022-04-20 LAB
CHOLEST SERPL-MCNC: 204 MG/DL
FASTING STATUS PATIENT QL REPORTED: YES
FASTING STATUS PATIENT QL REPORTED: YES
GLUCOSE BLD-MCNC: 89 MG/DL (ref 70–125)
HDLC SERPL-MCNC: 48 MG/DL
LDLC SERPL CALC-MCNC: 132 MG/DL
PSA SERPL-MCNC: 1.15 UG/L (ref 0–4.5)
TRIGL SERPL-MCNC: 121 MG/DL

## 2022-04-20 PROCEDURE — 99396 PREV VISIT EST AGE 40-64: CPT | Mod: 25 | Performed by: FAMILY MEDICINE

## 2022-04-20 PROCEDURE — 80061 LIPID PANEL: CPT | Performed by: FAMILY MEDICINE

## 2022-04-20 PROCEDURE — 91305 COVID-19,PF,PFIZER (12+ YRS): CPT | Performed by: FAMILY MEDICINE

## 2022-04-20 PROCEDURE — 36415 COLL VENOUS BLD VENIPUNCTURE: CPT | Performed by: FAMILY MEDICINE

## 2022-04-20 PROCEDURE — 82947 ASSAY GLUCOSE BLOOD QUANT: CPT | Performed by: FAMILY MEDICINE

## 2022-04-20 PROCEDURE — G0103 PSA SCREENING: HCPCS | Performed by: FAMILY MEDICINE

## 2022-04-20 PROCEDURE — 0054A COVID-19,PF,PFIZER (12+ YRS): CPT | Performed by: FAMILY MEDICINE

## 2022-04-20 RX ORDER — FLUOCINONIDE 0.5 MG/G
OINTMENT TOPICAL
COMMUNITY
Start: 2022-04-03

## 2022-04-20 NOTE — LETTER
April 22, 2022      Diego Naranjo  [address] [place] [address]        Dear ,    We are writing to inform you of your test results.  Sugar is good at 89, no diabetes.  Just the slightest elevation of cholesterol, though overall it is well controlled no concerns.  The PSA or prostate test is normal.    Resulted Orders   Glucose   Result Value Ref Range    Glucose 89 70 - 125 mg/dL    Patient Fasting > 8hrs? Yes    Lipid panel reflex to direct LDL Fasting   Result Value Ref Range    Cholesterol 204 (H) <=199 mg/dL    Triglycerides 121 <=149 mg/dL    Direct Measure HDL 48 >=40 mg/dL      Comment:      HDL Cholesterol Reference Range:     0-2 years:   No reference ranges established for patients under 2 years old  at [facility] for lipid analytes.    2-8 years:  Greater than 45 mg/dL     18 years and older:   Female: Greater than or equal to 50 mg/dL   Male:   Greater than or equal to 40 mg/dL    LDL Cholesterol Calculated 132 (H) <=129 mg/dL    Patient Fasting > 8hrs? Yes    PROSTATE SPEC ANTIGEN SCREEN   Result Value Ref Range    Prostate Specific Antigen Screen 1.15 0.00 - 4.50 ug/L    Narrative    Assay Method is Abbott Prostate-Specific Antigen (PSA)  Standard-WHO 1st International (90:10)       If you have any questions or concerns, please call the clinic at the number listed above.       Sincerely,      Mitul Carias MD

## 2022-04-20 NOTE — PROGRESS NOTES
SUBJECTIVE:   CC: Gabino Naranjo is an 61 year old male who presents for preventative health visit.   Patient has been advised of split billing requirements and indicates understanding: Yes     HPI  Patient comes in for his annual physical examination.  Patient has a history of basal cell skin cancer he is seen regularly by dermatology.    Patient has made some changes particular in his diet over the past several years his weight is down about 20 pounds, he generally is quite physically active though has been less so this winter.    Patient is up-to-date on preventive maintenance issues and he will get a COVID booster shot today.    Otherwise the patient has extremely good health habits.    Healthy Habits:     Getting at least 3 servings of Calcium per day:  Yes    Bi-annual eye exam:  Yes    Dental care twice a year:  Yes    Sleep apnea or symptoms of sleep apnea:  None    Diet:  Regular (no restrictions)    Frequency of exercise:  6-7 days/week    Duration of exercise:  15-30 minutes    Taking medications regularly:  Yes    Medication side effects:  None    PHQ-2 Total Score: 0    Additional concerns today:  No               Today's PHQ-2 Score:   PHQ-2 ( 1999 Pfizer) 4/20/2022   Q1: Little interest or pleasure in doing things 0   Q2: Feeling down, depressed or hopeless 0   PHQ-2 Score 0   Q1: Little interest or pleasure in doing things Not at all   Q2: Feeling down, depressed or hopeless Not at all   PHQ-2 Score 0       Abuse: Current or Past(Physical, Sexual or Emotional)- No  Do you feel safe in your environment? Yes        Social History     Tobacco Use     Smoking status: Never Smoker     Smokeless tobacco: Never Used   Substance Use Topics     Alcohol use: Yes     Alcohol/week: 4.0 standard drinks         Alcohol Use 4/20/2022   Prescreen: >3 drinks/day or >7 drinks/week? No       Last PSA:   Prostate Specific Antigen Screen   Date Value Ref Range Status   04/18/2018 1.0 0.0 - 3.5 ng/mL Final       Reviewed  orders with patient. Reviewed health maintenance and updated orders accordingly - Yes  Lab work is in process    Reviewed and updated as needed this visit by clinical staff                    Reviewed and updated as needed this visit by Provider                   Past Medical History:   Diagnosis Date     Colorectal polyps     colonoscopy done 1/26/12.      Diverticulitis     clinical dx LLQ pain and elevated WBC/resolved w cipro and flagyl in [place]      Shingles     one time, on back      Past Surgical History:   Procedure Laterality Date     INGUINAL HERNIA REPAIR Left      MOHS MICROGRAPHIC PROCEDURE  2018    nose     TONSILLECTOMY      Age 3       Review of Systems  CONSTITUTIONAL: NEGATIVE for fever, chills, change in weight  INTEGUMENTARY/SKIN: NEGATIVE for worrisome rashes, moles or lesions  EYES: NEGATIVE for vision changes or irritation  ENT: NEGATIVE for ear, mouth and throat problems  RESP: NEGATIVE for significant cough or SOB  CV: NEGATIVE for chest pain, palpitations or peripheral edema  GI: NEGATIVE for nausea, abdominal pain, heartburn, or change in bowel habits   male: negative for dysuria, hematuria, decreased urinary stream, erectile dysfunction, urethral discharge  MUSCULOSKELETAL: NEGATIVE for significant arthralgias or myalgia  NEURO: NEGATIVE for weakness, dizziness or paresthesias  PSYCHIATRIC: NEGATIVE for changes in mood or affect    OBJECTIVE:   There were no vitals taken for this visit.    Physical Exam  GENERAL: healthy, alert and no distress  EYES: Eyes grossly normal to inspection, PERRL and conjunctivae and sclerae normal  HENT: ear canals and TM's normal, nose and mouth without ulcers or lesions  NECK: no adenopathy, no asymmetry, masses, or scars and thyroid normal to palpation  RESP: lungs clear to auscultation - no rales, rhonchi or wheezes  CV: regular rate and rhythm, normal S1 S2, no S3 or S4, no murmur, click or rub, no peripheral edema and peripheral pulses  "strong  ABDOMEN: soft, nontender, no hepatosplenomegaly, no masses and bowel sounds normal  MS: no gross musculoskeletal defects noted, no edema  SKIN: no suspicious lesions or rashes  NEURO: Normal strength and tone, mentation intact and speech normal  PSYCH: mentation appears normal, affect normal/bright    Diagnostic Test Results:  Labs reviewed in Epic    ASSESSMENT/PLAN:       (Z00.00) Physical exam  Comment: The patient is in good health and has good health habits.  Plan:      (C44.91) Skin cancer, basal cell  Comment: Followed by dermatology  Plan:      (Z12.5) Screening for prostate cancer  Comment: No family history of  Plan: PROSTATE SPEC ANTIGEN SCREEN             (Z13.220) Screening for lipid disorders  Comment:    Plan: Glucose, Lipid panel reflex to direct LDL         Fasting             (Z23) High priority for 2019-nCoV vaccine  Comment:    Plan: COVID-19,PF,PFIZER (12+ Yrs GRAY LABEL)             PLAN:  1.  Third Pfizer shot first booster shot today.  2.  Laboratory studies as above  3.  Patient is followed regularly by dermatology  4.  Patient otherwise should be seen yearly      Patient has been advised of split billing requirements and indicates understanding: Yes    COUNSELING:   Reviewed preventive health counseling, as reflected in patient instructions       Regular exercise       Healthy diet/nutrition    Estimated body mass index is 27.69 kg/m  as calculated from the following:    Height as of 4/23/19: 1.74 m (5' 8.5\").    Weight as of 1/10/20: 83.8 kg (184 lb 12.8 oz).         He reports that he has never smoked. He has never used smokeless tobacco.      Counseling Resources:  ATP IV Guidelines  Pooled Cohorts Equation Calculator  FRAX Risk Assessment  ICSI Preventive Guidelines  Dietary Guidelines for Americans, 2010  USDA's MyPlate  ASA Prophylaxis  Lung CA Screening    Mitul Carias MD  [facility]  "

## 2022-09-25 ENCOUNTER — HEALTH MAINTENANCE LETTER (OUTPATIENT)
Age: 62
End: 2022-09-25

## 2022-09-26 ENCOUNTER — OFFICE VISIT (OUTPATIENT)
Dept: FAMILY MEDICINE | Facility: CLINIC | Age: 62
End: 2022-09-26
Payer: COMMERCIAL

## 2022-09-26 VITALS
WEIGHT: 176.4 LBS | HEART RATE: 74 BPM | SYSTOLIC BLOOD PRESSURE: 126 MMHG | DIASTOLIC BLOOD PRESSURE: 72 MMHG | BODY MASS INDEX: 26.74 KG/M2

## 2022-09-26 DIAGNOSIS — N50.89 SCROTAL SWELLING: Primary | ICD-10-CM

## 2022-09-26 PROCEDURE — 99213 OFFICE O/P EST LOW 20 MIN: CPT | Performed by: FAMILY MEDICINE

## 2022-09-26 NOTE — PROGRESS NOTES
Assessment & Plan     (N50.89) Scrotal swelling  (primary encounter diagnosis)  Comment: Recurrent intermittent left scrotal swelling, postcoital, possibly due to a reversible hydrocele.  Plan:      PLAN:  1.  Overall reassurance and watchful waiting, did discuss getting a scrotal ultrasound though I think this would be most useful if he were symptomatic, which he may be able to reproduce.  2.  Certainly if symptoms persist or worsen I would pursue a scrotal ultrasound                     No follow-ups on file.    Mitul Carias MD  [facility]    Elizabeth Cortez is a 61 year old presenting for the following health issues:    Patient comes in because he has noticed more recently that a day or 2 after sexual relations he will notice some swelling in the left scrotal area and some discomfort there.  Many years ago he had a left inguinal hernia repair but he is not noticed any bulging or pooching in that area.  No other change in his health status he does not notice any of the symptoms when he is moving and lifting carrying, symptoms are really fairly consistently reproducible after sexual activity but usually takes a day or 2.    I discussed with the patient that I am suspicious that there could be a small hydrocele that happens, I explained the pathophysiology of this I also reassured the patient that I do not see any evidence of a hernia nor my concerned about testicular cancer given how he is presenting.      History of Present Illness (Some discomfort around hernia repair site)      History of Present Illness       Reason for visit:  Pain in abdomen discomfort etc  Symptom onset:  More than a month  Symptoms include:  None today  Symptom intensity:  Mild  Symptom progression:  Staying the same  Had these symptoms before:  Yes  Has tried/received treatment for these symptoms:  No    He eats 2-3 servings of fruits and vegetables daily.He consumes 0 sweetened beverage(s)  daily.He exercises with enough effort to increase his heart rate 10 to 19 minutes per day.  He exercises with enough effort to increase his heart rate 5 days per week.   He is taking medications regularly.             Review of Systems         Objective    /72 (BP Location: Right arm, Patient Position: Sitting, Cuff Size: Adult Regular)   Pulse 74   Wt 80 kg (176 lb 6.4 oz)   BMI 26.74 kg/m    Body mass index is 26.74 kg/m .  Physical Exam    examination.  I do not see any evidence of a hernia on either the left or the right side at this time the scrotum is not particularly swollen or tender no extratesticular masses palpated.

## 2023-04-20 ENCOUNTER — PATIENT OUTREACH (OUTPATIENT)
Dept: CARE COORDINATION | Facility: CLINIC | Age: 63
End: 2023-04-20
Payer: COMMERCIAL

## 2023-05-01 ENCOUNTER — TRANSFERRED RECORDS (OUTPATIENT)
Dept: HEALTH INFORMATION MANAGEMENT | Facility: CLINIC | Age: 63
End: 2023-05-01
Payer: COMMERCIAL

## 2023-06-04 ENCOUNTER — HEALTH MAINTENANCE LETTER (OUTPATIENT)
Age: 63
End: 2023-06-04

## 2023-08-30 ENCOUNTER — OFFICE VISIT (OUTPATIENT)
Dept: FAMILY MEDICINE | Facility: CLINIC | Age: 63
End: 2023-08-30
Payer: COMMERCIAL

## 2023-08-30 VITALS
TEMPERATURE: 97.7 F | OXYGEN SATURATION: 99 % | RESPIRATION RATE: 12 BRPM | HEART RATE: 78 BPM | SYSTOLIC BLOOD PRESSURE: 118 MMHG | HEIGHT: 68 IN | WEIGHT: 177 LBS | DIASTOLIC BLOOD PRESSURE: 62 MMHG | BODY MASS INDEX: 26.83 KG/M2

## 2023-08-30 DIAGNOSIS — Z00.00 PHYSICAL EXAM: Primary | ICD-10-CM

## 2023-08-30 DIAGNOSIS — Z12.5 SCREENING FOR PROSTATE CANCER: ICD-10-CM

## 2023-08-30 DIAGNOSIS — Z13.220 SCREENING FOR LIPID DISORDERS: ICD-10-CM

## 2023-08-30 DIAGNOSIS — C44.91 SKIN CANCER, BASAL CELL: ICD-10-CM

## 2023-08-30 LAB
CHOLEST SERPL-MCNC: 189 MG/DL
FASTING STATUS PATIENT QL REPORTED: YES
GLUCOSE SERPL-MCNC: 100 MG/DL (ref 70–99)
HDLC SERPL-MCNC: 49 MG/DL
LDLC SERPL CALC-MCNC: 116 MG/DL
NONHDLC SERPL-MCNC: 140 MG/DL
PSA SERPL DL<=0.01 NG/ML-MCNC: 1.28 NG/ML (ref 0–4.5)
TRIGL SERPL-MCNC: 120 MG/DL

## 2023-08-30 PROCEDURE — 36415 COLL VENOUS BLD VENIPUNCTURE: CPT | Performed by: FAMILY MEDICINE

## 2023-08-30 PROCEDURE — 80061 LIPID PANEL: CPT | Performed by: FAMILY MEDICINE

## 2023-08-30 PROCEDURE — 82947 ASSAY GLUCOSE BLOOD QUANT: CPT | Performed by: FAMILY MEDICINE

## 2023-08-30 PROCEDURE — 99396 PREV VISIT EST AGE 40-64: CPT | Performed by: FAMILY MEDICINE

## 2023-08-30 PROCEDURE — G0103 PSA SCREENING: HCPCS | Performed by: FAMILY MEDICINE

## 2023-08-30 ASSESSMENT — ENCOUNTER SYMPTOMS
HEADACHES: 0
HEARTBURN: 0
HEMATURIA: 0
FREQUENCY: 0
WEAKNESS: 0
PARESTHESIAS: 0
HEMATOCHEZIA: 0
COUGH: 0
DYSURIA: 0
ABDOMINAL PAIN: 0
EYE PAIN: 0
FEVER: 0
MYALGIAS: 0
CONSTIPATION: 0
NAUSEA: 0
DIARRHEA: 0
DIZZINESS: 0
JOINT SWELLING: 0
NERVOUS/ANXIOUS: 0
ARTHRALGIAS: 0
SHORTNESS OF BREATH: 0
PALPITATIONS: 0
CHILLS: 0
SORE THROAT: 0

## 2023-08-30 NOTE — PROGRESS NOTES
SUBJECTIVE:   CC: Diego is an 62 year old who presents for preventative health visit.       8/30/2023     9:33 AM   Additional Questions   Roomed by Corrie SILVA       HPI:  Patient comes in for his annual physical examination  Patient's only underlying medical issue is a history of basal cell skin cancer he has had Mohs procedure on this, he is followed regularly by dermatology.    Overall the patient has extremely good health habits, he is active physically tries to eat well tries to be careful what he eats and when he eats.  Occasional heartburn.    Patient is up-to-date with immunizations he has had shingles, told the patient that he technically could get the shingles vaccine though he is at low risk but he should check with his insurance I do recommend the new COVID booster and influenza vaccines when available.          Healthy Habits:     Getting at least 3 servings of Calcium per day:  Yes    Bi-annual eye exam:  Yes    Dental care twice a year:  Yes    Sleep apnea or symptoms of sleep apnea:  None    Diet:  Regular (no restrictions) and Breakfast skipped    Frequency of exercise:  2-3 days/week    Duration of exercise:  Less than 15 minutes    Taking medications regularly:  Yes    Medication side effects:  Not applicable    Additional concerns today:  Yes      Today's PHQ-2 Score:       8/30/2023     8:17 AM   PHQ-2 ( 1999 Pfizer)   Q1: Little interest or pleasure in doing things 0   Q2: Feeling down, depressed or hopeless 0   PHQ-2 Score 0   Q1: Little interest or pleasure in doing things Not at all   Q2: Feeling down, depressed or hopeless Not at all   PHQ-2 Score 0              Social History     Tobacco Use    Smoking status: Never    Smokeless tobacco: Never   Substance Use Topics    Alcohol use: Yes     Alcohol/week: 3.0 standard drinks of alcohol     Types: 1 Glasses of wine, 2 Cans of beer per week             8/30/2023     8:16 AM   Alcohol Use   Prescreen: >3 drinks/day or >7 drinks/week? No        Last PSA:   Prostate Specific Antigen Screen   Date Value Ref Range Status   04/20/2022 1.15 0.00 - 4.50 ug/L Final       Reviewed orders with patient. Reviewed health maintenance and updated orders accordingly - Yes  Lab work is in process    Reviewed and updated as needed this visit by clinical staff   Tobacco  Allergies  Meds              Reviewed and updated as needed this visit by Provider                 Past Medical History:   Diagnosis Date    Colorectal polyps     colonoscopy done 1/26/12.     Diverticulitis     clinical dx LLQ pain and elevated WBC/resolved w cipro and flagyl in [place]     Shingles     one time, on back      Past Surgical History:   Procedure Laterality Date    INGUINAL HERNIA REPAIR Left     MOHS MICROGRAPHIC PROCEDURE  2018    nose    TONSILLECTOMY      Age 3       Review of Systems   Constitutional:  Negative for chills and fever.   HENT:  Negative for congestion, ear pain, hearing loss and sore throat.    Eyes:  Negative for pain and visual disturbance.   Respiratory:  Negative for cough and shortness of breath.    Cardiovascular:  Negative for chest pain, palpitations and peripheral edema.   Gastrointestinal:  Negative for abdominal pain, constipation, diarrhea, heartburn, hematochezia and nausea.   Genitourinary:  Negative for dysuria, frequency, genital sores, hematuria, impotence, penile discharge and urgency.   Musculoskeletal:  Negative for arthralgias, joint swelling and myalgias.   Skin:  Negative for rash.   Neurological:  Negative for dizziness, weakness, headaches and paresthesias.   Psychiatric/Behavioral:  Negative for mood changes. The patient is not nervous/anxious.      CONSTITUTIONAL: NEGATIVE for fever, chills, change in weight  INTEGUMENTARY/SKIN: NEGATIVE for worrisome rashes, moles or lesions  EYES: NEGATIVE for vision changes or irritation  ENT: NEGATIVE for ear, mouth and throat problems  RESP: NEGATIVE for significant cough or SOB  CV: NEGATIVE  "for chest pain, palpitations or peripheral edema  GI: NEGATIVE for nausea, abdominal pain, heartburn, or change in bowel habits   male: negative for dysuria, hematuria, decreased urinary stream, erectile dysfunction, urethral discharge  MUSCULOSKELETAL: NEGATIVE for significant arthralgias or myalgia  NEURO: NEGATIVE for weakness, dizziness or paresthesias  PSYCHIATRIC: NEGATIVE for changes in mood or affect    OBJECTIVE:   /62   Pulse 78   Temp 97.7  F (36.5  C) (Temporal)   Resp 12   Ht 1.727 m (5' 8\")   Wt 80.3 kg (177 lb)   SpO2 99%   BMI 26.91 kg/m      Physical Exam  GENERAL: healthy, alert and no distress  EYES: Eyes grossly normal to inspection, PERRL and conjunctivae and sclerae normal  HENT: ear canals and TM's normal, nose and mouth without ulcers or lesions  NECK: no adenopathy, no asymmetry, masses, or scars and thyroid normal to palpation  RESP: lungs clear to auscultation - no rales, rhonchi or wheezes  CV: regular rate and rhythm, normal S1 S2, no S3 or S4, no murmur, click or rub, no peripheral edema and peripheral pulses strong  ABDOMEN: soft, nontender, no hepatosplenomegaly, no masses and bowel sounds normal  MS: no gross musculoskeletal defects noted, no edema  SKIN: no suspicious lesions or rashes  NEURO: Normal strength and tone, mentation intact and speech normal  PSYCH: mentation appears normal, affect normal/bright    Diagnostic Test Results:  Labs reviewed in Epic    ASSESSMENT/PLAN:   (Z00.00) Physical exam  (primary encounter diagnosis)  Comment: Overall the patient has extremely good health habits and is in good health.  Plan:      (C44.91) Skin cancer, basal cell  Comment: Followed regularly by dermatology  Plan:      (Z12.5) Screening for prostate cancer  Comment: No known family history of prostate cancer  Plan: PROSTATE SPEC ANTIGEN SCREEN             (Z13.220) Screening for lipid disorders  Comment:    Plan: Glucose, Lipid panel reflex to direct LDL         Fasting   "           PLAN:  Laboratory studies as above  The patient is followed regularly by dermatology  Patient otherwise to maintain healthy habits and should be seen yearly            COUNSELING:   Reviewed preventive health counseling, as reflected in patient instructions       Regular exercise       Healthy diet/nutrition        He reports that he has never smoked. He has never used smokeless tobacco.            Mitul Carias MD  [facility]

## 2023-08-30 NOTE — LETTER
August 30, 2023      Diego Naranjo  [address] [place] [address]        Dear ,    We are writing to inform you of your test results.  Sugar is exactly 100, ideally under 100, however this is not diabetes, continue to be active and eat well to help keep blood sugar low.  Just the slightest elevation of cholesterol, again good diet and exercise will help keep low.  The PSA or prostate test is normal    See us again in 1 year          Resulted Orders   Glucose   Result Value Ref Range    Glucose 100 (H) 70 - 99 mg/dL    Patient Fasting > 8hrs? Yes    Lipid panel reflex to direct LDL Fasting   Result Value Ref Range    Cholesterol 189 <200 mg/dL    Triglycerides 120 <150 mg/dL    Direct Measure HDL 49 >=40 mg/dL    LDL Cholesterol Calculated 116 (H) <=100 mg/dL    Non HDL Cholesterol 140 (H) <130 mg/dL    Narrative    Cholesterol  Desirable:  <200 mg/dL    Triglycerides  Normal:  Less than 150 mg/dL  Borderline High:  150-199 mg/dL  High:  200-499 mg/dL  Very High:  Greater than or equal to 500 mg/dL    Direct Measure HDL  Female:  Greater than or equal to 50 mg/dL   Male:  Greater than or equal to 40 mg/dL    LDL Cholesterol  Desirable:  <100mg/dL  Above Desirable:  100-129 mg/dL   Borderline High:  130-159 mg/dL   High:  160-189 mg/dL   Very High:  >= 190 mg/dL    Non HDL Cholesterol  Desirable:  130 mg/dL  Above Desirable:  130-159 mg/dL  Borderline High:  160-189 mg/dL  High:  190-219 mg/dL  Very High:  Greater than or equal to 220 mg/dL   PROSTATE SPEC ANTIGEN SCREEN   Result Value Ref Range    Prostate Specific Antigen Screen 1.28 0.00 - 4.50 ng/mL    Narrative    This result is obtained using the Roche Elecsys total PSA method on the mary jo e801 immunoassay analyzer. Results obtained with different assay methods or kits cannot be used interchangeably.       If you have any questions or concerns, please call the clinic at the number listed above.       Sincerely,      Mitul Carias,  MD

## 2023-09-11 ENCOUNTER — TRANSFERRED RECORDS (OUTPATIENT)
Dept: HEALTH INFORMATION MANAGEMENT | Facility: CLINIC | Age: 63
End: 2023-09-11

## 2023-11-29 ENCOUNTER — OFFICE VISIT (OUTPATIENT)
Dept: FAMILY MEDICINE | Facility: CLINIC | Age: 63
End: 2023-11-29
Payer: COMMERCIAL

## 2023-11-29 VITALS
BODY MASS INDEX: 27.28 KG/M2 | SYSTOLIC BLOOD PRESSURE: 137 MMHG | HEIGHT: 68 IN | WEIGHT: 180 LBS | HEART RATE: 90 BPM | DIASTOLIC BLOOD PRESSURE: 81 MMHG | OXYGEN SATURATION: 97 % | TEMPERATURE: 98.1 F

## 2023-11-29 DIAGNOSIS — L60.0 INGROWING TOENAIL: Primary | ICD-10-CM

## 2023-11-29 PROCEDURE — 99213 OFFICE O/P EST LOW 20 MIN: CPT | Performed by: FAMILY MEDICINE

## 2023-11-29 RX ORDER — CEPHALEXIN 500 MG/1
500 CAPSULE ORAL 3 TIMES DAILY
Qty: 21 CAPSULE | Refills: 0 | Status: SHIPPED | OUTPATIENT
Start: 2023-11-29

## 2023-11-29 RX ORDER — FLUOCINONIDE TOPICAL SOLUTION USP, 0.05% 0.5 MG/ML
SOLUTION TOPICAL
COMMUNITY
Start: 2023-09-11

## 2023-11-29 ASSESSMENT — PAIN SCALES - GENERAL: PAINLEVEL: MILD PAIN (2)

## 2023-11-29 NOTE — PROGRESS NOTES
"  Assessment & Plan     (L60.0) Ingrowing toenail  (primary encounter diagnosis)  Comment: Acute onset right great toenail ingrown  Plan: cephALEXin (KEFLEX) 500 MG capsule             PLAN:  Empiric treatment with Keflex 500 mg 3 times daily x 7 days  Patient also will soak the foot in warm soapy water 15 to 20 minutes daily x 1 week  Follow-up as needed             BMI:   Estimated body mass index is 27.76 kg/m  as calculated from the following:    Height as of this encounter: 1.715 m (5' 7.52\").    Weight as of this encounter: 81.6 kg (180 lb).           Mitul Carias MD  [facility]    Elizabeth Cortez is a 63 year old, presenting for the following health issues:  Patient comes in because just in the past couple of days or so he has noticed some redness and discomfort around the right great toe this was not preceded by any injury or change in his usual activity.  It does look like there is an ingrown toenail there is some redness and inflammation.  I discussed with the patient that I would not want to treat this conservatively I am going to start him on an antibiotic I also want him to soak the foot in warm soapy water 15 to 20 minutes a day or so for a week    Told the patient this intervention usually is successful may take a week or 2, if not we may need to consider podiatry referral for toenail removal or more definitive treatment      Toe Pain (Concern for infection in toe- right foot, big toe, redness and swelling)        11/29/2023     1:57 PM   Additional Questions   Roomed by [facility], Visit Facilitator       History of Present Illness       Reason for visit:  Redness, soreness on my right big toe.  Symptom onset:  1-3 days ago  Symptoms include:  Discomfort  Symptom intensity:  Moderate  Symptom progression:  Staying the same  Had these symptoms before:  Yes  Has tried/received treatment for these symptoms:  No  What makes it worse:  No  What makes it better:  " "No    He eats 2-3 servings of fruits and vegetables daily.He consumes 0 sweetened beverage(s) daily.He exercises with enough effort to increase his heart rate 9 or less minutes per day.  He exercises with enough effort to increase his heart rate 3 or less days per week.   He is taking medications regularly.                 Review of Systems         Objective    BP (!) 146/80 (BP Location: Left arm, Patient Position: Sitting, Cuff Size: Adult Regular)   Pulse 90   Temp 98.1  F (36.7  C) (Oral)   Ht 1.715 m (5' 7.52\")   Wt 81.6 kg (180 lb)   SpO2 97%   BMI 27.76 kg/m    Body mass index is 27.76 kg/m .  Physical Exam   Right great toe examination there is a rim of redness around the nail of the right great toe, there is little tenderness as well as not warm to the touch.  The toenail on the lateral aspect looks a little jagged to me and I think this is where it is ingrown                        "

## 2024-01-30 ENCOUNTER — TRANSFERRED RECORDS (OUTPATIENT)
Dept: HEALTH INFORMATION MANAGEMENT | Facility: CLINIC | Age: 64
End: 2024-01-30

## 2024-09-03 ENCOUNTER — OFFICE VISIT (OUTPATIENT)
Dept: FAMILY MEDICINE | Facility: CLINIC | Age: 64
End: 2024-09-03
Payer: COMMERCIAL

## 2024-09-03 VITALS
HEART RATE: 92 BPM | DIASTOLIC BLOOD PRESSURE: 70 MMHG | BODY MASS INDEX: 25.03 KG/M2 | OXYGEN SATURATION: 98 % | TEMPERATURE: 98.4 F | HEIGHT: 69 IN | WEIGHT: 169 LBS | SYSTOLIC BLOOD PRESSURE: 122 MMHG

## 2024-09-03 DIAGNOSIS — Z12.5 SCREENING FOR PROSTATE CANCER: ICD-10-CM

## 2024-09-03 DIAGNOSIS — C44.91 SKIN CANCER, BASAL CELL: ICD-10-CM

## 2024-09-03 DIAGNOSIS — Z13.220 SCREENING FOR LIPID DISORDERS: ICD-10-CM

## 2024-09-03 DIAGNOSIS — Z00.00 PHYSICAL EXAM: Primary | ICD-10-CM

## 2024-09-03 DIAGNOSIS — Z23 NEED FOR VACCINATION: ICD-10-CM

## 2024-09-03 LAB
ANION GAP SERPL CALCULATED.3IONS-SCNC: 10 MMOL/L (ref 7–15)
BUN SERPL-MCNC: 11.4 MG/DL (ref 8–23)
CALCIUM SERPL-MCNC: 9.3 MG/DL (ref 8.8–10.4)
CHLORIDE SERPL-SCNC: 107 MMOL/L (ref 98–107)
CHOLEST SERPL-MCNC: 148 MG/DL
CREAT SERPL-MCNC: 1.02 MG/DL (ref 0.67–1.17)
EGFRCR SERPLBLD CKD-EPI 2021: 82 ML/MIN/1.73M2
FASTING STATUS PATIENT QL REPORTED: YES
FASTING STATUS PATIENT QL REPORTED: YES
GLUCOSE SERPL-MCNC: 106 MG/DL (ref 70–99)
HCO3 SERPL-SCNC: 25 MMOL/L (ref 22–29)
HDLC SERPL-MCNC: 41 MG/DL
LDLC SERPL CALC-MCNC: 90 MG/DL
NONHDLC SERPL-MCNC: 107 MG/DL
POTASSIUM SERPL-SCNC: 4.8 MMOL/L (ref 3.4–5.3)
PSA SERPL DL<=0.01 NG/ML-MCNC: 1.19 NG/ML (ref 0–4.5)
SODIUM SERPL-SCNC: 142 MMOL/L (ref 135–145)
TRIGL SERPL-MCNC: 86 MG/DL

## 2024-09-03 PROCEDURE — 90471 IMMUNIZATION ADMIN: CPT | Performed by: FAMILY MEDICINE

## 2024-09-03 PROCEDURE — 36415 COLL VENOUS BLD VENIPUNCTURE: CPT | Performed by: FAMILY MEDICINE

## 2024-09-03 PROCEDURE — 80048 BASIC METABOLIC PNL TOTAL CA: CPT | Performed by: FAMILY MEDICINE

## 2024-09-03 PROCEDURE — 99213 OFFICE O/P EST LOW 20 MIN: CPT | Mod: 25 | Performed by: FAMILY MEDICINE

## 2024-09-03 PROCEDURE — 99397 PER PM REEVAL EST PAT 65+ YR: CPT | Mod: 25 | Performed by: FAMILY MEDICINE

## 2024-09-03 PROCEDURE — G0103 PSA SCREENING: HCPCS | Performed by: FAMILY MEDICINE

## 2024-09-03 PROCEDURE — 90677 PCV20 VACCINE IM: CPT | Performed by: FAMILY MEDICINE

## 2024-09-03 PROCEDURE — 80061 LIPID PANEL: CPT | Performed by: FAMILY MEDICINE

## 2024-09-03 NOTE — LETTER
September 4, 2024      Diego Naranjo  [address] [place] [address]        Dear ,    We are writing to inform you of your test results.  The PSA or prostate test is normal  Sugar is just slightly high at 106, ideally under 100, however this is not in the range of diabetes, being active and eating well I will help keep blood sugar low.  Kidney tests are normal  Cholesterol is very well-controlled    See us again in 1 year        Resulted Orders   PROSTATE SPEC ANTIGEN SCREEN   Result Value Ref Range    Prostate Specific Antigen Screen 1.19 0.00 - 4.50 ng/mL    Narrative    This result is obtained using the Roche Elecsys total PSA method on the mary jo e801 immunoassay analyzer. Results obtained with different assay methods or kits cannot be used interchangeably.   Basic metabolic panel   Result Value Ref Range    Sodium 142 135 - 145 mmol/L    Potassium 4.8 3.4 - 5.3 mmol/L    Chloride 107 98 - 107 mmol/L    Carbon Dioxide (CO2) 25 22 - 29 mmol/L    Anion Gap 10 7 - 15 mmol/L    Urea Nitrogen 11.4 8.0 - 23.0 mg/dL    Creatinine 1.02 0.67 - 1.17 mg/dL    GFR Estimate 82 >60 mL/min/1.73m2      Comment:      eGFR calculated using 2021 CKD-EPI equation.    Calcium 9.3 8.8 - 10.4 mg/dL      Comment:      Reference intervals for this test were updated on 7/16/2024 to reflect our healthy population more accurately. There may be differences in the flagging of prior results with similar values performed with this method. Those prior results can be interpreted in the context of the updated reference intervals.    Glucose 106 (H) 70 - 99 mg/dL    Patient Fasting > 8hrs? Yes    Lipid panel reflex to direct LDL Fasting   Result Value Ref Range    Cholesterol 148 <200 mg/dL    Triglycerides 86 <150 mg/dL    Direct Measure HDL 41 >=40 mg/dL    LDL Cholesterol Calculated 90 <=100 mg/dL    Non HDL Cholesterol 107 <130 mg/dL    Patient Fasting > 8hrs? Yes     Narrative    Cholesterol  Desirable:  <200  mg/dL    Triglycerides  Normal:  Less than 150 mg/dL  Borderline High:  150-199 mg/dL  High:  200-499 mg/dL  Very High:  Greater than or equal to 500 mg/dL    Direct Measure HDL  Female:  Greater than or equal to 50 mg/dL   Male:  Greater than or equal to 40 mg/dL    LDL Cholesterol  Desirable:  <100mg/dL  Above Desirable:  100-129 mg/dL   Borderline High:  130-159 mg/dL   High:  160-189 mg/dL   Very High:  >= 190 mg/dL    Non HDL Cholesterol  Desirable:  130 mg/dL  Above Desirable:  130-159 mg/dL  Borderline High:  160-189 mg/dL  High:  190-219 mg/dL  Very High:  Greater than or equal to 220 mg/dL       If you have any questions or concerns, please call the clinic at the number listed above.       Sincerely,      Mitul Carias MD

## 2024-09-03 NOTE — PROGRESS NOTES
Preventive Care Visit  [facility], [facility]  Sep 3, 2024      SUBJECTIVE:   Diego is a 63 year old, presenting for the following:  No chief complaint on file.        9/3/2024     7:54 AM   Additional Questions   Roomed by Corrie POWER     HPI  Patient comes in for his annual physical examination.  Patient has a known history of basal cell skin cancer he has had a lesion removed from his nose he had a Mohs procedure and also from his back.  He has had a lesion in the lower part of the back that actually has been seen by dermatology in the past but he believes in the past couple of months that it is changed in size a bit, on inspection I do think this warrants it being biopsied of note he has an appointment with his regular dermatologist in December but I advised if he can get an sooner to have this looked at to see if it should be biopsied or not.  I also recommend he take a picture of this.    Other than that the patient has extremely good health habits he is not on any regular medication blood pressure is well-controlled he has been losing weight over time due to improved diet.    I do recommend the pneumonia vaccine, he also is a candidate for other vaccines but we will go ahead with the pneumonia.            Today's PHQ-2 Score:       9/3/2024     6:43 AM   PHQ-2 ( 1999 Pfizer)   Q1: Little interest or pleasure in doing things 0   Q2: Feeling down, depressed or hopeless 0   PHQ-2 Score 0   Q1: Little interest or pleasure in doing things Not at all   Q2: Feeling down, depressed or hopeless Not at all   PHQ-2 Score 0                   Have you ever done Advance Care Planning? (For example, a Health Directive, POLST, or a discussion with a medical provider or your loved ones about your wishes): No, advance care planning information given to patient to review.  Patient plans to discuss their wishes with loved ones or provider.      Social History     Tobacco Use     "Smoking status: Never     Passive exposure: Never    Smokeless tobacco: Never   Substance Use Topics    Alcohol use: Yes     Alcohol/week: 3.0 standard drinks of alcohol     Types: 1 Glasses of wine, 2 Cans of beer per week             9/3/2024     6:42 AM   Alcohol Use   Prescreen: >3 drinks/day or >7 drinks/week? No       Last PSA:   Prostate Specific Antigen Screen   Date Value Ref Range Status   08/30/2023 1.28 0.00 - 4.50 ng/mL Final   04/20/2022 1.15 0.00 - 4.50 ug/L Final       Reviewed orders with patient. Reviewed health maintenance and updated orders accordingly - Yes  Lab work is in process  Labs reviewed in [facility]  Current Outpatient Medications   Medication Sig Dispense Refill    fluocinonide (LIDEX) 0.05 % external solution       cephALEXin (KEFLEX) 500 MG capsule Take 1 capsule (500 mg) by mouth 3 times daily 21 capsule 0    fluocinonide (LIDEX) 0.05 % external ointment APPLICATION TWICE A DAY AS NEEDED TOPICALLY TO EARS (Patient not taking: Reported on 11/29/2023)       No Known Allergies    Reviewed and updated as needed this visit by clinical staff   Tobacco  Allergies  Meds              Reviewed and updated as needed this visit by Provider                  Past Medical History:   Diagnosis Date    Colorectal polyps     colonoscopy done 1/26/12.     Diverticulitis     clinical dx LLQ pain and elevated WBC/resolved w cipro and flagyl in [place]     Shingles     one time, on back      Past Surgical History:   Procedure Laterality Date    INGUINAL HERNIA REPAIR Left     MOHS MICROGRAPHIC PROCEDURE  2018    nose    TONSILLECTOMY      Age 3     Review of Systems    Review of Systems  Constitutional, HEENT, cardiovascular, pulmonary, GI, , musculoskeletal, neuro, skin, endocrine and psych systems are negative, except as otherwise noted.    OBJECTIVE:   /70   Pulse 92   Temp 98.4  F (36.9  C)   Ht 1.753 m (5' 9\")   Wt 76.7 kg (169 lb)   SpO2 98%   BMI 24.96 kg/m     Estimated body " "mass index is 24.96 kg/m  as calculated from the following:    Height as of this encounter: 1.753 m (5' 9\").    Weight as of this encounter: 76.7 kg (169 lb).  Physical Exam  GENERAL: alert and no distress  EYES: Eyes grossly normal to inspection, PERRL and conjunctivae and sclerae normal  HENT: ear canals and TM's normal, nose and mouth without ulcers or lesions  NECK: no adenopathy, no asymmetry, masses, or scars  RESP: lungs clear to auscultation - no rales, rhonchi or wheezes  CV: regular rate and rhythm, normal S1 S2, no S3 or S4, no murmur, click or rub, no peripheral edema  ABDOMEN: soft, nontender, no hepatosplenomegaly, no masses and bowel sounds normal  MS: no gross musculoskeletal defects noted, no edema  SKIN: no suspicious lesions or rashes  NEURO: Normal strength and tone, mentation intact and speech normal  PSYCH: mentation appears normal, affect normal/bright    Diagnostic Test Results:  Labs reviewed in Epic    ASSESSMENT/PLAN:   (Z00.00) Physical exam  (primary encounter diagnosis)  Comment: Overall the patient has extremely good health habits and is in good health.  Plan: PRIMARY CARE FOLLOW-UP SCHEDULING             (C44.91) Skin cancer, basal cell  Comment: Patient with a known history of basal cell skin cancer, he has a lesion in the lower back area that I do believe is suspicious and warrants a biopsy  Plan:      (Z12.5) Screening for prostate cancer  Comment: No family history of  Plan: PROSTATE SPEC ANTIGEN SCREEN             (Z23) Need for vaccination  Comment:    Plan: Pneumococcal 20 Valent Conjugate (PCV20)             (Z13.220) Screening for lipid disorders  Comment:    Plan: Basic metabolic panel, Lipid panel reflex to         direct LDL Fasting    PLAN:  1.  Pneumonia 20 vaccine  2.  Laboratory studies as above  3.  I recommend the patient follow-up with dermatology both for his known history of skin cancer and a lesion in the low back area.  4.  Patient otherwise should be seen " yearly                       Counseling  Reviewed preventive health counseling, as reflected in patient instructions       Regular exercise       Healthy diet/nutrition        He reports that he has never smoked. He has never been exposed to tobacco smoke. He has never used smokeless tobacco.            Signed Electronically by: Mitul Carias MD

## 2024-09-05 ENCOUNTER — TRANSFERRED RECORDS (OUTPATIENT)
Dept: HEALTH INFORMATION MANAGEMENT | Facility: CLINIC | Age: 64
End: 2024-09-05
Payer: COMMERCIAL

## 2025-08-04 ENCOUNTER — PATIENT OUTREACH (OUTPATIENT)
Dept: CARE COORDINATION | Facility: CLINIC | Age: 65
End: 2025-08-04
Payer: COMMERCIAL